# Patient Record
Sex: MALE | Employment: UNEMPLOYED | URBAN - METROPOLITAN AREA
[De-identification: names, ages, dates, MRNs, and addresses within clinical notes are randomized per-mention and may not be internally consistent; named-entity substitution may affect disease eponyms.]

---

## 2024-07-29 ENCOUNTER — APPOINTMENT (OUTPATIENT)
Dept: RADIOLOGY | Facility: MEDICAL CENTER | Age: 39
DRG: 219 | End: 2024-07-29
Attending: STUDENT IN AN ORGANIZED HEALTH CARE EDUCATION/TRAINING PROGRAM
Payer: COMMERCIAL

## 2024-07-29 ENCOUNTER — APPOINTMENT (OUTPATIENT)
Dept: CARDIOLOGY | Facility: MEDICAL CENTER | Age: 39
DRG: 219 | End: 2024-07-29
Payer: COMMERCIAL

## 2024-07-29 PROCEDURE — 71260 CT THORAX DX C+: CPT

## 2024-07-29 PROCEDURE — 93306 TTE W/DOPPLER COMPLETE: CPT

## 2024-07-29 PROCEDURE — 93306 TTE W/DOPPLER COMPLETE: CPT | Mod: 26 | Performed by: INTERNAL MEDICINE

## 2024-07-30 ENCOUNTER — APPOINTMENT (OUTPATIENT)
Dept: RADIOLOGY | Facility: MEDICAL CENTER | Age: 39
DRG: 219 | End: 2024-07-30
Payer: COMMERCIAL

## 2024-07-30 PROCEDURE — 72040 X-RAY EXAM NECK SPINE 2-3 VW: CPT

## 2024-07-31 ENCOUNTER — APPOINTMENT (OUTPATIENT)
Dept: RADIOLOGY | Facility: MEDICAL CENTER | Age: 39
DRG: 219 | End: 2024-07-31
Payer: COMMERCIAL

## 2024-07-31 ENCOUNTER — APPOINTMENT (OUTPATIENT)
Dept: CARDIOLOGY | Facility: MEDICAL CENTER | Age: 39
DRG: 219 | End: 2024-07-31
Payer: COMMERCIAL

## 2024-07-31 PROCEDURE — 76536 US EXAM OF HEAD AND NECK: CPT

## 2024-08-01 ENCOUNTER — ANESTHESIA (OUTPATIENT)
Dept: CARDIOLOGY | Facility: MEDICAL CENTER | Age: 39
DRG: 219 | End: 2024-08-01
Payer: COMMERCIAL

## 2024-08-01 ENCOUNTER — ANESTHESIA EVENT (OUTPATIENT)
Dept: CARDIOLOGY | Facility: MEDICAL CENTER | Age: 39
DRG: 219 | End: 2024-08-01
Payer: COMMERCIAL

## 2024-08-01 ENCOUNTER — APPOINTMENT (OUTPATIENT)
Dept: CARDIOLOGY | Facility: MEDICAL CENTER | Age: 39
DRG: 219 | End: 2024-08-01
Payer: COMMERCIAL

## 2024-08-01 PROBLEM — J18.9 PNEUMONIA OF BOTH LUNGS DUE TO INFECTIOUS ORGANISM: Status: RESOLVED | Noted: 2024-08-01 | Resolved: 2024-08-01

## 2024-08-01 PROBLEM — I38 ENDOCARDITIS: Status: ACTIVE | Noted: 2024-08-01

## 2024-08-01 PROBLEM — R50.9 ACUTE FEBRILE ILLNESS: Status: RESOLVED | Noted: 2024-07-29 | Resolved: 2024-08-01

## 2024-08-01 PROBLEM — J18.9 PNEUMONIA OF BOTH LUNGS DUE TO INFECTIOUS ORGANISM: Status: ACTIVE | Noted: 2024-08-01

## 2024-08-01 PROCEDURE — 93312 ECHO TRANSESOPHAGEAL: CPT | Mod: 26 | Performed by: INTERNAL MEDICINE

## 2024-08-01 PROCEDURE — 700101 HCHG RX REV CODE 250: Performed by: ANESTHESIOLOGY

## 2024-08-01 PROCEDURE — 93325 DOPPLER ECHO COLOR FLOW MAPG: CPT

## 2024-08-01 PROCEDURE — 700111 HCHG RX REV CODE 636 W/ 250 OVERRIDE (IP): Performed by: ANESTHESIOLOGY

## 2024-08-01 PROCEDURE — 700105 HCHG RX REV CODE 258: Performed by: ANESTHESIOLOGY

## 2024-08-01 RX ORDER — SODIUM CHLORIDE, SODIUM LACTATE, POTASSIUM CHLORIDE, CALCIUM CHLORIDE 600; 310; 30; 20 MG/100ML; MG/100ML; MG/100ML; MG/100ML
INJECTION, SOLUTION INTRAVENOUS
Status: DISCONTINUED | OUTPATIENT
Start: 2024-08-01 | End: 2024-08-01 | Stop reason: SURG

## 2024-08-01 RX ORDER — LIDOCAINE HYDROCHLORIDE 20 MG/ML
INJECTION, SOLUTION EPIDURAL; INFILTRATION; INTRACAUDAL; PERINEURAL PRN
Status: DISCONTINUED | OUTPATIENT
Start: 2024-08-01 | End: 2024-08-01 | Stop reason: SURG

## 2024-08-01 RX ADMIN — PROPOFOL 100 MG: 10 INJECTION, EMULSION INTRAVENOUS at 13:10

## 2024-08-01 RX ADMIN — PROPOFOL 100 MG: 10 INJECTION, EMULSION INTRAVENOUS at 13:01

## 2024-08-01 RX ADMIN — PROPOFOL 50 MG: 10 INJECTION, EMULSION INTRAVENOUS at 13:04

## 2024-08-01 RX ADMIN — FENTANYL CITRATE 100 MCG: 50 INJECTION, SOLUTION INTRAMUSCULAR; INTRAVENOUS at 12:56

## 2024-08-01 RX ADMIN — FENTANYL CITRATE 50 MCG: 50 INJECTION, SOLUTION INTRAMUSCULAR; INTRAVENOUS at 13:11

## 2024-08-01 RX ADMIN — LIDOCAINE HYDROCHLORIDE 50 MG: 20 INJECTION, SOLUTION EPIDURAL; INFILTRATION; INTRACAUDAL at 13:01

## 2024-08-01 RX ADMIN — SODIUM CHLORIDE, POTASSIUM CHLORIDE, SODIUM LACTATE AND CALCIUM CHLORIDE: 600; 310; 30; 20 INJECTION, SOLUTION INTRAVENOUS at 12:54

## 2024-08-01 RX ADMIN — FENTANYL CITRATE 50 MCG: 50 INJECTION, SOLUTION INTRAMUSCULAR; INTRAVENOUS at 13:16

## 2024-08-01 ASSESSMENT — PAIN SCALES - GENERAL: PAIN_LEVEL: 0

## 2024-08-01 NOTE — ANESTHESIA PREPROCEDURE EVALUATION
Date/Time: 08/01/24 1400    Scheduled providers: Jeffery Perkins M.D.; Wayne Bragg M.D.    Procedure: EC-MISHEL W/O CONT    Diagnosis:             Pulmonary cavitary lesion [J98.4]            Pulmonary cavitary lesion [J98.4]    Location: [facility] - Echocardiology [facility]            Relevant Problems   PULMONARY   (positive) Pneumonia of both lungs due to infectious organism       Physical Exam    Airway   Mallampati: II  TM distance: >3 FB  Neck ROM: full       Cardiovascular - normal exam  Rhythm: regular  Rate: normal  (-) murmur     Dental - normal exam           Pulmonary - normal exam  Breath sounds clear to auscultation     Abdominal    Neurological - normal exam                   Anesthesia Plan    ASA 2       Plan - general       Airway plan will be mask          Induction: intravenous      Pertinent diagnostic labs and testing reviewed    Informed Consent:    Anesthetic plan and risks discussed with patient.    Use of blood products discussed with: patient whom consented to blood products.

## 2024-08-01 NOTE — ANESTHESIA TIME REPORT
Anesthesia Start and Stop Event Times       Date Time Event    8/1/2024 1254 Ready for Procedure     1254 Anesthesia Start     1336 Anesthesia Stop          Responsible Staff  08/01/24      Name Role Begin End    Wayne Bragg M.D. Anesth 1254 1336          Overtime Reason:  no overtime (within assigned shift)    Comments:

## 2024-08-01 NOTE — ANESTHESIA POSTPROCEDURE EVALUATION
Patient: Two Ankur    Procedure Summary       Date: 08/01/24 Room / Location: [facility] Imaging - Echocardiology [facility]    Anesthesia Start: 1254 Anesthesia Stop: 1336    Procedure: EC-MISHEL W/O CONT Diagnosis:             Pulmonary cavitary lesion            Pulmonary cavitary lesion    Scheduled Providers: Jeffery Perkins M.D.; Wayne Bragg M.D. Responsible Provider: Wayne Bragg M.D.    Anesthesia Type: general ASA Status: 2            Final Anesthesia Type: general  Last vitals  BP   Blood Pressure: 118/57    Temp   36.8 °C (98.3 °F)    Pulse   (!) 121   Resp   (!) 22    SpO2   98 %      Anesthesia Post Evaluation    Patient location during evaluation: PACU  Patient participation: complete - patient participated  Level of consciousness: awake and alert  Pain score: 0    Airway patency: patent  Anesthetic complications: no  Cardiovascular status: hemodynamically stable  Respiratory status: acceptable  Hydration status: euvolemic    PONV: controlled          There were no known notable events for this encounter.     Nurse Pain Score: 0 (NPRS)

## 2024-08-02 PROBLEM — I33.0 ACUTE BACTERIAL ENDOCARDITIS: Status: ACTIVE | Noted: 2024-08-02

## 2024-08-06 ENCOUNTER — APPOINTMENT (OUTPATIENT)
Dept: RADIOLOGY | Facility: MEDICAL CENTER | Age: 39
DRG: 219 | End: 2024-08-06
Attending: NURSE PRACTITIONER
Payer: COMMERCIAL

## 2024-08-06 PROCEDURE — 71046 X-RAY EXAM CHEST 2 VIEWS: CPT

## 2024-08-07 ENCOUNTER — APPOINTMENT (OUTPATIENT)
Dept: CARDIOLOGY | Facility: MEDICAL CENTER | Age: 39
DRG: 219 | End: 2024-08-07
Attending: ANESTHESIOLOGY
Payer: COMMERCIAL

## 2024-08-07 ENCOUNTER — ANESTHESIA EVENT (OUTPATIENT)
Dept: SURGERY | Facility: MEDICAL CENTER | Age: 39
DRG: 219 | End: 2024-08-07
Payer: COMMERCIAL

## 2024-08-07 ENCOUNTER — ANESTHESIA (OUTPATIENT)
Dept: SURGERY | Facility: MEDICAL CENTER | Age: 39
DRG: 219 | End: 2024-08-07
Payer: COMMERCIAL

## 2024-08-07 ENCOUNTER — APPOINTMENT (OUTPATIENT)
Dept: RADIOLOGY | Facility: MEDICAL CENTER | Age: 39
DRG: 219 | End: 2024-08-07
Attending: THORACIC SURGERY (CARDIOTHORACIC VASCULAR SURGERY)
Payer: COMMERCIAL

## 2024-08-07 PROBLEM — Z86.19 HISTORY OF HEPATITIS C: Status: ACTIVE | Noted: 2024-08-07

## 2024-08-07 PROBLEM — Z99.11 ENCOUNTER FOR WEANING FROM VENTILATOR (HCC): Status: ACTIVE | Noted: 2024-08-07

## 2024-08-07 PROBLEM — R79.89 ELEVATED LFTS: Status: ACTIVE | Noted: 2024-07-29

## 2024-08-07 PROBLEM — Z95.4: Status: ACTIVE | Noted: 2024-08-07

## 2024-08-07 PROCEDURE — 700105 HCHG RX REV CODE 258: Performed by: NURSE PRACTITIONER

## 2024-08-07 PROCEDURE — 700101 HCHG RX REV CODE 250: Performed by: NURSE PRACTITIONER

## 2024-08-07 PROCEDURE — 93319 3D ECHO IMG CGEN CAR ANOMAL: CPT

## 2024-08-07 PROCEDURE — 700102 HCHG RX REV CODE 250 W/ 637 OVERRIDE(OP): Performed by: NURSE PRACTITIONER

## 2024-08-07 PROCEDURE — 700105 HCHG RX REV CODE 258: Performed by: ANESTHESIOLOGY

## 2024-08-07 PROCEDURE — 71045 X-RAY EXAM CHEST 1 VIEW: CPT

## 2024-08-07 PROCEDURE — 700111 HCHG RX REV CODE 636 W/ 250 OVERRIDE (IP): Performed by: NURSE PRACTITIONER

## 2024-08-07 PROCEDURE — 700111 HCHG RX REV CODE 636 W/ 250 OVERRIDE (IP): Performed by: THORACIC SURGERY (CARDIOTHORACIC VASCULAR SURGERY)

## 2024-08-07 PROCEDURE — 700111 HCHG RX REV CODE 636 W/ 250 OVERRIDE (IP): Performed by: ANESTHESIOLOGY

## 2024-08-07 PROCEDURE — 700101 HCHG RX REV CODE 250: Performed by: ANESTHESIOLOGY

## 2024-08-07 RX ORDER — SODIUM CHLORIDE, SODIUM GLUCONATE, SODIUM ACETATE, POTASSIUM CHLORIDE AND MAGNESIUM CHLORIDE 526; 502; 368; 37; 30 MG/100ML; MG/100ML; MG/100ML; MG/100ML; MG/100ML
INJECTION, SOLUTION INTRAVENOUS
Status: DISCONTINUED | OUTPATIENT
Start: 2024-08-07 | End: 2024-08-07 | Stop reason: SURG

## 2024-08-07 RX ORDER — HEPARIN SODIUM,PORCINE 1000/ML
VIAL (ML) INJECTION PRN
Status: DISCONTINUED | OUTPATIENT
Start: 2024-08-07 | End: 2024-08-07 | Stop reason: SURG

## 2024-08-07 RX ORDER — PROTAMINE SULFATE 10 MG/ML
INJECTION, SOLUTION INTRAVENOUS PRN
Status: DISCONTINUED | OUTPATIENT
Start: 2024-08-07 | End: 2024-08-07 | Stop reason: SURG

## 2024-08-07 RX ORDER — MIDAZOLAM HYDROCHLORIDE 1 MG/ML
INJECTION INTRAMUSCULAR; INTRAVENOUS PRN
Status: DISCONTINUED | OUTPATIENT
Start: 2024-08-07 | End: 2024-08-07 | Stop reason: SURG

## 2024-08-07 RX ORDER — SODIUM CHLORIDE 9 MG/ML
INJECTION, SOLUTION INTRAVENOUS
Status: DISCONTINUED | OUTPATIENT
Start: 2024-08-07 | End: 2024-08-07 | Stop reason: SURG

## 2024-08-07 RX ORDER — SODIUM CHLORIDE, SODIUM LACTATE, POTASSIUM CHLORIDE, CALCIUM CHLORIDE 600; 310; 30; 20 MG/100ML; MG/100ML; MG/100ML; MG/100ML
INJECTION, SOLUTION INTRAVENOUS
Status: DISCONTINUED | OUTPATIENT
Start: 2024-08-07 | End: 2024-08-07 | Stop reason: SURG

## 2024-08-07 RX ADMIN — AMINOCAPROIC ACID 2 G/HR: 250 INJECTION, SOLUTION INTRAVENOUS at 10:57

## 2024-08-07 RX ADMIN — HEPARIN SODIUM 32000 UNITS: 1000 INJECTION, SOLUTION INTRAVENOUS; SUBCUTANEOUS at 09:14

## 2024-08-07 RX ADMIN — EPINEPHRINE 0.02 MCG/KG/MIN: 1 INJECTION INTRAMUSCULAR; INTRAVENOUS; SUBCUTANEOUS at 11:31

## 2024-08-07 RX ADMIN — DEXMEDETOMIDINE 0.4 MCG/KG/HR: 200 INJECTION, SOLUTION INTRAVENOUS at 08:56

## 2024-08-07 RX ADMIN — NOREPINEPHRINE BITARTRATE 0.04 MCG/KG/MIN: 1 INJECTION, SOLUTION, CONCENTRATE INTRAVENOUS at 10:37

## 2024-08-07 RX ADMIN — METHADONE HYDROCHLORIDE 10 MG: 10 INJECTION, SOLUTION INTRAMUSCULAR; INTRAVENOUS; SUBCUTANEOUS at 08:07

## 2024-08-07 RX ADMIN — SODIUM CHLORIDE, SODIUM GLUCONATE, SODIUM ACETATE, POTASSIUM CHLORIDE AND MAGNESIUM CHLORIDE: 526; 502; 368; 37; 30 INJECTION, SOLUTION INTRAVENOUS at 08:03

## 2024-08-07 RX ADMIN — ROCURONIUM BROMIDE 50 MG: 10 INJECTION, SOLUTION INTRAVENOUS at 08:07

## 2024-08-07 RX ADMIN — ROCURONIUM BROMIDE 30 MG: 10 INJECTION, SOLUTION INTRAVENOUS at 09:19

## 2024-08-07 RX ADMIN — PROTAMINE SULFATE 250 MG: 10 INJECTION, SOLUTION INTRAVENOUS at 11:31

## 2024-08-07 RX ADMIN — ROCURONIUM BROMIDE 50 MG: 10 INJECTION, SOLUTION INTRAVENOUS at 11:49

## 2024-08-07 RX ADMIN — VANCOMYCIN HYDROCHLORIDE 1500 MG: 1 INJECTION, POWDER, LYOPHILIZED, FOR SOLUTION INTRAVENOUS at 08:26

## 2024-08-07 RX ADMIN — ROCURONIUM BROMIDE 30 MG: 10 INJECTION, SOLUTION INTRAVENOUS at 08:35

## 2024-08-07 RX ADMIN — ROCURONIUM BROMIDE 20 MG: 10 INJECTION, SOLUTION INTRAVENOUS at 09:07

## 2024-08-07 RX ADMIN — MIDAZOLAM HYDROCHLORIDE 2 MG: 2 INJECTION, SOLUTION INTRAMUSCULAR; INTRAVENOUS at 08:07

## 2024-08-07 RX ADMIN — PROPOFOL 150 MG: 10 INJECTION, EMULSION INTRAVENOUS at 08:07

## 2024-08-07 RX ADMIN — SODIUM CHLORIDE 2 UNITS/HR: 9 INJECTION, SOLUTION INTRAVENOUS at 12:11

## 2024-08-07 RX ADMIN — VASOPRESSIN 0.04 UNITS/HR: 20 INJECTION INTRAVENOUS at 11:15

## 2024-08-07 RX ADMIN — AMINOCAPROIC ACID 8000 MG: 250 INJECTION, SOLUTION INTRAVENOUS at 08:56

## 2024-08-07 RX ADMIN — SODIUM CHLORIDE, POTASSIUM CHLORIDE, SODIUM LACTATE AND CALCIUM CHLORIDE: 600; 310; 30; 20 INJECTION, SOLUTION INTRAVENOUS at 08:03

## 2024-08-07 RX ADMIN — ROCURONIUM BROMIDE 50 MG: 10 INJECTION, SOLUTION INTRAVENOUS at 10:43

## 2024-08-07 RX ADMIN — SODIUM CHLORIDE: 9 INJECTION, SOLUTION INTRAVENOUS at 08:03

## 2024-08-07 RX ADMIN — METHADONE HYDROCHLORIDE 10 MG: 10 INJECTION, SOLUTION INTRAMUSCULAR; INTRAVENOUS; SUBCUTANEOUS at 09:09

## 2024-08-07 NOTE — ANESTHESIA PROCEDURE NOTES
Central Venous Line    Performed by: Dallas Gray M.D.  Authorized by: Dallas Gray M.D.    Start Time:  8/7/2024 8:22 AM  End Time:  8/7/2024 8:30 AM  Patient Location:  OR  Indication: central venous access and hemodynamic monitoring        provider hand hygiene performed prior to central venous catheter insertion, all 5 sterile barriers used (gloves, gown, cap, mask, large sterile drape) during central venous catheter insertion and skin prep agent completely dried prior to procedure    Patient Position:  Trendelenburg  Laterality:  Right  Site:  Internal jugular  Prep:  Chlorhexidine  Catheter Size:  8.5 Fr  Catheter Length (cm):  11.5  Catheter Type:  Introducer  Number of Lumens:  Single lumen  target vein identified, needle advanced into vein and blood aspirated and guidewire advanced into vein    Seldinger Technique?: Yes    Ultrasound-Guided: ultrasound-guided  Image captured, interpreted and electronically stored.  Sterile Gel and Probe Cover Used for Ultrasound?: Yes    Intravenous Verification: verified by ultrasound, venous blood return and chest x-ray pending    all ports aspirated, all ports flushed easily, guidewire was removed intact, biopatch was applied, line was sutured in place and dressing was applied    Events: patient tolerated procedure well with no complications    PA Catheter Placed?: Yes    PA Catheter Type:  Non-oximetric  PA Catheter Size:  7  Laterality:  Right  Site:  Through introducer  Placement Confirmation: MISHEL and x-ray    Events: patient tolerated procedure well with no complications     Double stick technique.  Two central lines placed in RIJ vein using ultrasound guidance.  Lines placed at separate and distinct sites in R IJ.  One line is cordis and swan, and the other line is a triple lumen.

## 2024-08-07 NOTE — ANESTHESIA PROCEDURE NOTES
MISHEL    Date/Time: 8/7/2024 8:30 AM    Performed by: Dallas Gray M.D.  Authorized by: Dallas Gray M.D.    Start Time:8/7/2024 8:30 AM  Preanesthetic Checklist: patient identified, IV checked, site marked, risks and benefits discussed, surgical consent, monitors and equipment checked, pre-op evaluation and timeout performed    Indication for MISHEL: diagnostic   Patient Location: OR  Intubated: Yes  Bite Block: Yes  Heart Visualized: Yes  Insertion: atraumatic    **See FULL MISHEL report in patient's chart via CV [facility]**

## 2024-08-07 NOTE — ANESTHESIA TIME REPORT
Anesthesia Start and Stop Event Times       Date Time Event    8/7/2024 0803 Anesthesia Start     0904 Ready for Procedure     1254 Anesthesia Stop          Responsible Staff  08/07/24      Name Role Begin End    Dallas Gray M.D. Anesth 0803 1254          Overtime Reason:  no overtime (within assigned shift)    Comments:

## 2024-08-07 NOTE — ANESTHESIA PREPROCEDURE EVALUATION
" Case: [id number] Anesthesia Start Date/Time: 08/07/24 0803    Procedures:       REPAIR, TRICUSPID VALVE - PULMONIC VALVE REPLACEMENT      REPLACEMENT, AORTIC VALVE      ECHOCARDIOGRAM, TRANSESOPHAGEAL, INTRAOPERATIVE    Location: [facility] 02 / SURGERY [facility]    Surgeons: Irvin Mayes D.O.            Relevant Problems   Other   (positive) Acute bacterial endocarditis   (positive) Elevated LFTs   (positive) History of hepatitis C   (positive) IV drug abuse (HCC)     /57   Pulse 97   Temp 36.6 °C (97.9 °F) (Temporal)   Resp 20   Ht 1.778 m (5' 10\")   Wt 79.4 kg (175 lb 0.7 oz)   SpO2 92%   BMI 25.12 kg/m²     Physical Exam    Airway   Mallampati: II  TM distance: >3 FB  Neck ROM: full       Cardiovascular - normal exam  Rhythm: regular  Rate: normal  (-) murmur     Dental - normal exam           Pulmonary - normal exam  Breath sounds clear to auscultation     Abdominal    Neurological - normal exam                   Anesthesia Plan    ASA 4   ASA physical status 4 criteria: sepsis    Plan - general       Airway plan will be ETT          Induction: intravenous    Postoperative Plan: Postoperative administration of opioids is intended.    Pertinent diagnostic labs and testing reviewed    Informed Consent:    Anesthetic plan and risks discussed with patient.    Use of blood products discussed with: patient whom consented to blood products.           "

## 2024-08-07 NOTE — ANESTHESIA PROCEDURE NOTES
Arterial Line    Performed by: Dallas Gray M.D.  Authorized by: Dallas Gray M.D.    Start Time:  8/7/2024 8:15 AM  End Time:  8/7/2024 8:17 AM  Localization: surface landmarks    Patient Location:  OR  Indication: continuous blood pressure monitoring        Catheter Size:  20 G  Seldinger Technique?: Yes    Laterality:  Right  Site:  Radial artery  Line Secured:  Antimicrobial disc, tape and transparent dressing  Events: patient tolerated procedure well with no complications

## 2024-08-07 NOTE — ANESTHESIA PROCEDURE NOTES
Airway    Date/Time: 8/7/2024 8:08 AM    Performed by: Dallas Gray M.D.  Authorized by: Dallas Gray M.D.    Location:  OR  Urgency:  Elective  Difficult Airway: No    Indications for Airway Management:  Anesthesia      Spontaneous Ventilation: absent    Sedation Level:  Deep  Preoxygenated: Yes    Patient Position:  Sniffing  Mask Difficulty Assessment:  0 - not attempted  Final Airway Type:  Endotracheal airway  Final Endotracheal Airway:  ETT  Cuffed: Yes    Technique Used for Successful ETT Placement:  Direct laryngoscopy    Insertion Site:  Oral  Blade Type:  Erick  Laryngoscope Blade/Videolaryngoscope Blade Size:  3  ETT Size (mm):  8.0  Measured from:  Teeth  ETT to Teeth (cm):  23  Placement Verified by: auscultation and capnometry    Cormack-Lehane Classification:  Grade I - full view of glottis  Number of Attempts at Approach:  1

## 2024-08-07 NOTE — ANESTHESIA PROCEDURE NOTES
Central Venous Line    Performed by: Dallas Gray M.D.  Authorized by: Dallas Gray M.D.    Start Time:  8/7/2024 8:22 AM  End Time:  8/7/2024 8:30 AM  Patient Location:  OR  Indication: central venous access and hemodynamic monitoring        provider hand hygiene performed prior to central venous catheter insertion, all 5 sterile barriers used (gloves, gown, cap, mask, large sterile drape) during central venous catheter insertion and skin prep agent completely dried prior to procedure    Patient Position:  Trendelenburg  Laterality:  Right  Site:  Internal jugular  Prep:  Chlorhexidine  Catheter Size:  7 Fr  Catheter Length (cm):  20  Number of Lumens:  Triple lumen  target vein identified, needle advanced into vein and blood aspirated and guidewire advanced into vein    Seldinger Technique?: Yes    Ultrasound-Guided: ultrasound-guided  Image captured, interpreted and electronically stored.  Sterile Gel and Probe Cover Used for Ultrasound?: Yes    Intravenous Verification: verified by ultrasound, venous blood return and chest x-ray pending    all ports aspirated, all ports flushed easily, guidewire was removed intact, biopatch was applied, line was sutured in place and dressing was applied    Events: patient tolerated procedure well with no complications    PA Catheter Placed?: No     Double stick technique.  Two central lines placed in RIJ vein using ultrasound guidance.  Lines placed at separate and distinct sites in R IJ.  One line is cordis and swan, and the other line is a triple lumen.

## 2024-08-08 ENCOUNTER — APPOINTMENT (OUTPATIENT)
Dept: RADIOLOGY | Facility: MEDICAL CENTER | Age: 39
DRG: 219 | End: 2024-08-08
Attending: NURSE PRACTITIONER
Payer: COMMERCIAL

## 2024-08-08 PROBLEM — D62 ACUTE BLOOD LOSS ANEMIA: Status: ACTIVE | Noted: 2024-07-29

## 2024-08-08 PROCEDURE — 71045 X-RAY EXAM CHEST 1 VIEW: CPT

## 2024-08-08 NOTE — ANESTHESIA POSTPROCEDURE EVALUATION
Patient: Two Ankur    Procedure Summary       Date: 08/07/24 Room / Location: [facility] 02 / SURGERY [facility]    Anesthesia Start: 0803 Anesthesia Stop: 1254    Procedures:       PULMONIC VALVE REPLACEMENTWITH PULMONARY ARTERY REPAIR (Chest)      ECHOCARDIOGRAM, TRANSESOPHAGEAL, INTRAOPERATIVE (Esophagus) Diagnosis: (Bacterial endocarditis of pulmonary and tricuspid valves)    Surgeons: Irvin Mayes D.O. Responsible Provider: Dallas Gray M.D.    Anesthesia Type: general ASA Status: 4            Final Anesthesia Type: general  Last vitals  BP   Blood Pressure: 99/45, Arterial BP: (!) 98/42    Temp   36.6 °C (97.9 °F)    Pulse   75   Resp   20    SpO2   100 %      Anesthesia Post Evaluation    Patient location during evaluation: PACU  Patient participation: complete - patient participated  Level of consciousness: awake and alert    Airway patency: patent  Anesthetic complications: no  Cardiovascular status: hemodynamically stable  Respiratory status: face mask    PONV: none          No notable events documented.     Nurse Pain Score: 9 (NPRS)

## 2024-08-10 PROBLEM — R78.81 BACTEREMIA: Status: ACTIVE | Noted: 2024-08-10

## 2024-08-12 ENCOUNTER — APPOINTMENT (OUTPATIENT)
Dept: RADIOLOGY | Facility: MEDICAL CENTER | Age: 39
DRG: 219 | End: 2024-08-12
Attending: INTERNAL MEDICINE
Payer: COMMERCIAL

## 2024-08-15 ENCOUNTER — HOSPITAL ENCOUNTER (EMERGENCY)
Facility: MEDICAL CENTER | Age: 39
End: 2024-08-15
Attending: EMERGENCY MEDICINE
Payer: COMMERCIAL

## 2024-08-15 VITALS
RESPIRATION RATE: 14 BRPM | SYSTOLIC BLOOD PRESSURE: 104 MMHG | HEIGHT: 70 IN | WEIGHT: 175 LBS | OXYGEN SATURATION: 97 % | TEMPERATURE: 97.6 F | HEART RATE: 82 BPM | BODY MASS INDEX: 25.05 KG/M2 | DIASTOLIC BLOOD PRESSURE: 70 MMHG

## 2024-08-15 DIAGNOSIS — T82.898A OCCLUSION OF PERIPHERALLY INSERTED CENTRAL CATHETER (PICC) LINE, INITIAL ENCOUNTER (HCC): ICD-10-CM

## 2024-08-15 LAB
ANION GAP SERPL CALC-SCNC: 11 MMOL/L (ref 7–16)
BASOPHILS # BLD AUTO: 0.4 % (ref 0–1.8)
BASOPHILS # BLD: 0.03 K/UL (ref 0–0.12)
BUN SERPL-MCNC: 14 MG/DL (ref 8–22)
CALCIUM SERPL-MCNC: 8.3 MG/DL (ref 8.5–10.5)
CHLORIDE SERPL-SCNC: 100 MMOL/L (ref 96–112)
CO2 SERPL-SCNC: 25 MMOL/L (ref 20–33)
CREAT SERPL-MCNC: 0.49 MG/DL (ref 0.5–1.4)
EOSINOPHIL # BLD AUTO: 0.21 K/UL (ref 0–0.51)
EOSINOPHIL NFR BLD: 2.5 % (ref 0–6.9)
ERYTHROCYTE [DISTWIDTH] IN BLOOD BY AUTOMATED COUNT: 51.6 FL (ref 35.9–50)
GFR SERPLBLD CREATININE-BSD FMLA CKD-EPI: 136 ML/MIN/1.73 M 2
GLUCOSE SERPL-MCNC: 110 MG/DL (ref 65–99)
HCT VFR BLD AUTO: 30.6 % (ref 42–52)
HGB BLD-MCNC: 9.9 G/DL (ref 14–18)
IMM GRANULOCYTES # BLD AUTO: 0.04 K/UL (ref 0–0.11)
IMM GRANULOCYTES NFR BLD AUTO: 0.5 % (ref 0–0.9)
LYMPHOCYTES # BLD AUTO: 1.83 K/UL (ref 1–4.8)
LYMPHOCYTES NFR BLD: 22.1 % (ref 22–41)
MCH RBC QN AUTO: 28.1 PG (ref 27–33)
MCHC RBC AUTO-ENTMCNC: 32.4 G/DL (ref 32.3–36.5)
MCV RBC AUTO: 86.9 FL (ref 81.4–97.8)
MONOCYTES # BLD AUTO: 0.65 K/UL (ref 0–0.85)
MONOCYTES NFR BLD AUTO: 7.9 % (ref 0–13.4)
NEUTROPHILS # BLD AUTO: 5.51 K/UL (ref 1.82–7.42)
NEUTROPHILS NFR BLD: 66.6 % (ref 44–72)
NRBC # BLD AUTO: 0 K/UL
NRBC BLD-RTO: 0 /100 WBC (ref 0–0.2)
PLATELET # BLD AUTO: 307 K/UL (ref 164–446)
PMV BLD AUTO: 9.7 FL (ref 9–12.9)
POTASSIUM SERPL-SCNC: 4.2 MMOL/L (ref 3.6–5.5)
RBC # BLD AUTO: 3.52 M/UL (ref 4.7–6.1)
SODIUM SERPL-SCNC: 136 MMOL/L (ref 135–145)
WBC # BLD AUTO: 8.3 K/UL (ref 4.8–10.8)

## 2024-08-15 PROCEDURE — 700111 HCHG RX REV CODE 636 W/ 250 OVERRIDE (IP): Performed by: EMERGENCY MEDICINE

## 2024-08-15 PROCEDURE — 80048 BASIC METABOLIC PNL TOTAL CA: CPT

## 2024-08-15 PROCEDURE — 85025 COMPLETE CBC W/AUTO DIFF WBC: CPT

## 2024-08-15 PROCEDURE — 36415 COLL VENOUS BLD VENIPUNCTURE: CPT

## 2024-08-15 PROCEDURE — 99283 EMERGENCY DEPT VISIT LOW MDM: CPT

## 2024-08-15 RX ADMIN — HEPARIN 500 UNITS: 100 SYRINGE at 21:16

## 2024-08-15 RX ADMIN — HEPARIN 500 UNITS: 100 SYRINGE at 20:07

## 2024-08-15 ASSESSMENT — FIBROSIS 4 INDEX: FIB4 SCORE: 0.81

## 2024-08-16 NOTE — ED NOTES
PICC line flushed with heparin. Flush was met with resistance but then resolved after multiple attempts to flush. Erp notified. The pt is alert and oriented. PD at bedside. Vitals stable

## 2024-08-16 NOTE — ED NOTES
Pt resting on gurney , pt in retirement restraints , correctional officers at bedside , ERP at bedside

## 2024-08-16 NOTE — ED NOTES
PICC flushed with haparin with no resistance. The pt is alert and oriented. Vitals stable. PD at bedside

## 2024-08-16 NOTE — ED NOTES
"Pt discharged in PD custody. The pt is alert and oriented, calm and cooperative, talks with clear coherent speech, ambulates with a steady gait, moves extremities to dress self. The pt denies pain. Vitals stable on the monitor. Pt received copy of discharge instructions and verbalized understanding. /70   Pulse 82   Temp 36.4 °C (97.6 °F) (Temporal)   Resp 14   Ht 1.778 m (5' 10\")   Wt 79.4 kg (175 lb)   SpO2 97%   BMI 25.11 kg/m²     "

## 2024-08-16 NOTE — ED PROVIDER NOTES
ED Provider Note    CHIEF COMPLAINT  Chief Complaint   Patient presents with    Sent by MD     [facility] from [facility] for PICC line replacement, pt states that he needs a new one d/t clots in the PICC. Pt states that he has the PICC for abx admin        EXTERNAL RECORDS REVIEWED  Inpatient note 7/29 the patient was admitted found to have bacterial endocarditis with septic emboli to the lung.  He underwent pulmonary valve replacement on 8/7 supposed be on 6 weeks of cefepime as well as Bactrim twice daily for 6 months    HPI/ROS  LIMITATION TO HISTORY   Select: : None  OUTSIDE HISTORIAN(S):  Officers with the patient were just told that his PICC line was occluded and told him to bring him here    Claudia Valero is a 38 y.o. male, with past medical history of injecting meth into his neck mid July 2024, and the needle broke and still has it in his neck.  He was admitted to [facility] on July 29, 2024, when it was found that he had bacterial endocarditis and septic emboli to the lung and splenic emboli too.  Patient had a pulmonic valve replacement on 08/07/2024.  He is on cefepime which needed to be continued for 6 weeks after surgery and he was told to stop on 09/18/24.  Patient presented to ER today sent from his MD because of PICC line occluded.  At the ED patient reporting that he feels well denied any shortness of breath, chest pain, lower extremity swelling, orthopnea, paroxysmal nocturnal dyspnea.  Patient denied any headache, blurry vision, abdominal pain, diarrhea or constipation.  He denied any past medical history of heart disease, high blood pressure, diabetes or any other disease.  Patient denied any family history of heart disease but he stated that his mother has diabetes mellitus.    PAST MEDICAL HISTORY       SURGICAL HISTORY   has a past surgical history that includes tricuspid valve repair (8/7/2024) and echocardiogram, transesophageal, intraoperative (8/7/2024).    FAMILY  "HISTORY  No family history on file.    SOCIAL HISTORY  Social History     Tobacco Use    Smoking status: Never    Smokeless tobacco: Never   Vaping Use    Vaping status: Never Used   Substance and Sexual Activity    Alcohol use: Never    Drug use: Yes     Types: Intravenous     Comment: 2 weeks ago    Sexual activity: Not on file       CURRENT MEDICATIONS  Home Medications    **Home medications have not yet been reviewed for this encounter**         ALLERGIES  No Known Allergies    PHYSICAL EXAM  VITAL SIGNS: /58   Pulse 81   Temp 36.3 °C (97.4 °F) (Temporal)   Resp 16   Ht 1.778 m (5' 10\")   Wt 79.4 kg (175 lb)   SpO2 98%   BMI 25.11 kg/m²    Pulse OX: Pulse Oxygen level is within normal limits  Constitutional: Alert in no apparent distress.  HENT: Normocephalic, Atraumatic, MMM  Eyes: PERound. Conjunctiva normal, non-icteric.   Heart: Regular rate and rhythm, intact distal pulses 3 out of 6 murmur  Lungs: Symmetrical movement, no resp distress clear to auscultation bilaterally  Abdomen: Non-tender, non-distended, normal bowel sounds  EXT/Back no edema PICC line in the right upper extremity clean dry and intact without erythema induration or tenderness  Skin: Warm, Dry, No erythema, No rash.   Neurologic: Alert and oriented, Grossly non-focal.       EKG/LABS  Labs Reviewed   CBC WITH DIFFERENTIAL - Abnormal; Notable for the following components:       Result Value    RBC 3.52 (*)     Hemoglobin 9.9 (*)     Hematocrit 30.6 (*)     RDW 51.6 (*)     All other components within normal limits   BASIC METABOLIC PANEL - Abnormal; Notable for the following components:    Glucose 110 (*)     Creatinine 0.49 (*)     Calcium 8.3 (*)     All other components within normal limits   ESTIMATED GFR           I have independently interpreted this EKG        COURSE & MEDICAL DECISION MAKING    ASSESSMENT, COURSE AND PLAN  Care Narrative: This is a 37 years old male with past medical history of drug use complicated with " infective endocarditis, thrombotic pulmonary embolus, along with splenic embolism.  Patient on cefepime for 6-week use.  Referred to ED by his MD for PICC line occluded.    Will flush the PICC line with heparin flush to see if we can unclog it if this does not work then the patient and need to be hospitalized for PICC line replacement IV antibiotics.    DISPOSITION AND DISCUSSIONS  Fortunately the PICC was able to be flushed here in the emerged part with heparin it is flushing well and drawing well.  He was reflushed again for heparin lock so that it does not occlude when he is discharged back to his facility.  He is otherwise well-appearing no elevated white blood cell count mild chronic anemia renal function is unremarkable a otherwise there is no indication for hospitalization at this time        I have discussed management of the patient with the following physicians and NOEL's:  none    Discussion of management with other QHP or appropriate source(s): None     Escalation of care considered, and ultimately not performed:diagnostic imaging    Barriers to care at this time, including but not limited to:  na .     Decision tools and prescription drugs considered including, but not limited to:  na .    The patient will return for new or worsening symptoms and is stable at the time of discharge.    The patient is referred to a primary physician for blood pressure management, diabetic screening, and for all other preventative health concerns.    DISPOSITION:  Patient will be discharged home in stable condition.    FOLLOW UP:  [facility], Emergency Dept  [address] [address] [phone]    If symptoms worsen      OUTPATIENT MEDICATIONS:  Discharge Medication List as of 8/15/2024  9:37 PM            FINAL DIAGNOSIS  1. Occlusion of peripherally inserted central catheter (PICC) line, initial encounter (HCC)         Electronically signed by: Mary Don M.D., 8/15/2024 6:21  PM

## 2024-08-16 NOTE — ED NOTES
Bedside report received from off going RN/tech: Sree, assumed care of patient.  POC discussed with patient. Call light within reach, all needs addressed at this time.       Fall risk interventions in place: Not Applicable (all applicable per [place] Fall risk assessment)   Continuous monitoring: Pulse Ox or Blood Pressure  IVF/IV medications: Not Applicable   Oxygen: Room Air  Bedside sitter: Not Applicable   Isolation: Not Applicable      d

## 2024-09-20 ENCOUNTER — TELEPHONE (OUTPATIENT)
Dept: CARDIOLOGY | Facility: MEDICAL CENTER | Age: 39
End: 2024-09-20
Payer: COMMERCIAL

## 2024-09-20 NOTE — TELEPHONE ENCOUNTER
Phone number called: [phone]     Call outcome: 2nd attempt. No answer. LVM. Need to get more information.

## 2024-09-20 NOTE — TELEPHONE ENCOUNTER
ADD(BD)          Caller: Louann with [facility]    Topic/issue: Their office was calling because the physicians at the facility wanted to know if he could switch from his Warfarin 9 mg prescription to Lovenox due to taking an oral antibiotic, Bactrim, and it impedes the warfarin and their office was asking for a call back.      Callback Number: [phone]      Thank you    -Luis Alberto KATZ

## 2024-09-20 NOTE — TELEPHONE ENCOUNTER
Phone number called: Louann with [facility] ([phone])    Call outcome: I tried to contact the number above but unable to reach the . Left VM and will try to call again.

## 2024-09-27 ENCOUNTER — HOSPITAL ENCOUNTER (EMERGENCY)
Facility: MEDICAL CENTER | Age: 39
End: 2024-09-27
Attending: STUDENT IN AN ORGANIZED HEALTH CARE EDUCATION/TRAINING PROGRAM
Payer: COMMERCIAL

## 2024-09-27 ENCOUNTER — APPOINTMENT (OUTPATIENT)
Dept: RADIOLOGY | Facility: MEDICAL CENTER | Age: 39
End: 2024-09-27
Attending: STUDENT IN AN ORGANIZED HEALTH CARE EDUCATION/TRAINING PROGRAM
Payer: COMMERCIAL

## 2024-09-27 ENCOUNTER — OFFICE VISIT (OUTPATIENT)
Dept: CARDIOLOGY | Facility: MEDICAL CENTER | Age: 39
End: 2024-09-27
Attending: INTERNAL MEDICINE
Payer: COMMERCIAL

## 2024-09-27 VITALS
HEIGHT: 70 IN | OXYGEN SATURATION: 96 % | HEART RATE: 70 BPM | SYSTOLIC BLOOD PRESSURE: 106 MMHG | BODY MASS INDEX: 23.34 KG/M2 | DIASTOLIC BLOOD PRESSURE: 53 MMHG | RESPIRATION RATE: 18 BRPM | WEIGHT: 163 LBS | TEMPERATURE: 97.4 F

## 2024-09-27 VITALS
WEIGHT: 163 LBS | BODY MASS INDEX: 23.34 KG/M2 | RESPIRATION RATE: 14 BRPM | OXYGEN SATURATION: 98 % | HEIGHT: 70 IN | HEART RATE: 78 BPM | SYSTOLIC BLOOD PRESSURE: 108 MMHG | DIASTOLIC BLOOD PRESSURE: 56 MMHG

## 2024-09-27 DIAGNOSIS — S50.01XA CONTUSION OF RIGHT ELBOW, INITIAL ENCOUNTER: ICD-10-CM

## 2024-09-27 DIAGNOSIS — Z86.79 HISTORY OF ENDOCARDITIS: ICD-10-CM

## 2024-09-27 DIAGNOSIS — S20.219A CONTUSION OF CHEST WALL, UNSPECIFIED LATERALITY, INITIAL ENCOUNTER: ICD-10-CM

## 2024-09-27 DIAGNOSIS — Z29.89 NEED FOR SBE (SUBACUTE BACTERIAL ENDOCARDITIS) PROPHYLAXIS: ICD-10-CM

## 2024-09-27 DIAGNOSIS — Z95.2 HX OF PULMONIC VALVE REPLACEMENT: ICD-10-CM

## 2024-09-27 PROCEDURE — 307740 HCHG GREEN TRAUMA TEAM SERVICES

## 2024-09-27 PROCEDURE — 700102 HCHG RX REV CODE 250 W/ 637 OVERRIDE(OP): Performed by: STUDENT IN AN ORGANIZED HEALTH CARE EDUCATION/TRAINING PROGRAM

## 2024-09-27 PROCEDURE — 71250 CT THORAX DX C-: CPT

## 2024-09-27 PROCEDURE — 99284 EMERGENCY DEPT VISIT MOD MDM: CPT

## 2024-09-27 PROCEDURE — A9270 NON-COVERED ITEM OR SERVICE: HCPCS | Performed by: STUDENT IN AN ORGANIZED HEALTH CARE EDUCATION/TRAINING PROGRAM

## 2024-09-27 PROCEDURE — 73080 X-RAY EXAM OF ELBOW: CPT | Mod: RT

## 2024-09-27 RX ORDER — ACETAMINOPHEN 325 MG/1
975 TABLET ORAL ONCE
Status: COMPLETED | OUTPATIENT
Start: 2024-09-27 | End: 2024-09-27

## 2024-09-27 RX ADMIN — ACETAMINOPHEN 975 MG: 325 TABLET ORAL at 18:09

## 2024-09-27 ASSESSMENT — FIBROSIS 4 INDEX: FIB4 SCORE: 0.75

## 2024-09-27 NOTE — PROGRESS NOTES
"    Cardiology Initial Consultation Note    Date of note:    9/27/2024    Primary Care Provider: Pcp Unknown  Referring Provider: No ref. provider found    Patient Name: Alcides Bragg     YOB: 1985  MRN:              [id number]    No chief complaint on file.      History of Present Illness: Mr. Alcides Bragg is a 38-year-old man with past medical history significant for polysubstance abuse including IV drug use and methamphetamine abuse underwent recent pulmonic valve replacement after he was admitted to the hospital and found to have endocarditis.    He was taken to the OR by Dr. Mayes and had pulmonic valve replacement with 29 mm bioprosthetic Inspiris valve.  Also had pulmonary artery patch angioplasty.  During the OR, MISHEL showed no evidence of tricuspid valve endocarditis.  In the value was examined during surgery which appeared to be free of disease.     He presents to the cardiology office 1 month after valve replacement to establish care.  He is currently in the custody of a correctional officers and currently an inmate at a correctional facility.    Today, he has no major cardiac complaints.  He is no longer on Lasix or metoprolol.  He denies having chest pain or dyspnea.  No lightheadedness/dizziness or episodes of syncope.  No lower extremity edema.  Remains on Coumadin as managed by the correctional facility.    Cardiovascular Risk Factors:  1. Smoking status: Denies  2. Type II Diabetes Mellitus: Prediabetes   Lab Results   Component Value Date/Time    HBA1C 6.4 (H) 08/06/2024 03:33 PM     3. Hypertension: Denies  4. Dyslipidemia: Denies No results found for: \"CHOLSTRLTOT\", \"LDL\", \"HDL\", \"TRIGLYCERIDE\"  5. Family history of early Coronary Artery Disease in a first degree relative (Male less than 55 years of age; Female less than 65 years of age): Denies      Review of Systems:  As per HPI.  Review of systems assessed and are negative except as stated above.      History reviewed. " No pertinent past medical history.      Past Surgical History:   Procedure Laterality Date    TRICUSPID VALVE REPAIR  8/7/2024    Procedure: PULMONIC VALVE REPLACEMENTWITH PULMONARY ARTERY REPAIR;  Surgeon: Irvin Mayes D.O.;  Location: SURGERY [facility];  Service: Cardiothoracic    ECHOCARDIOGRAM, TRANSESOPHAGEAL, INTRAOPERATIVE  8/7/2024    Procedure: ECHOCARDIOGRAM, TRANSESOPHAGEAL, INTRAOPERATIVE;  Surgeon: Irvin Mayes D.O.;  Location: SURGERY [facility];  Service: Cardiothoracic         Current Outpatient Medications   Medication Sig Dispense Refill    warfarin (COUMADIN) 10 MG Tab Take 1 Tablet by mouth every day at 6 PM.      sulfamethoxazole-trimethoprim (BACTRIM DS) 800-160 MG tablet Take 1 Tablet by mouth 2 times a day. Start after IV antibiotics (cefepime) therapy has completed. 90 Tablet 0     No current facility-administered medications for this visit.         No Known Allergies      History reviewed. No pertinent family history.      Social History     Socioeconomic History    Marital status: Unknown     Spouse name: Not on file    Number of children: Not on file    Years of education: Not on file    Highest education level: Not on file   Occupational History    Not on file   Tobacco Use    Smoking status: Never    Smokeless tobacco: Never   Vaping Use    Vaping status: Never Used   Substance and Sexual Activity    Alcohol use: Never    Drug use: Yes     Types: Intravenous     Comment: 2 weeks ago    Sexual activity: Not on file   Other Topics Concern    Not on file   Social History Narrative    Not on file     Social Determinants of Health     Financial Resource Strain: Not on file   Food Insecurity: No Food Insecurity (7/29/2024)    Hunger Vital Sign     Worried About Running Out of Food in the Last Year: Never true     Ran Out of Food in the Last Year: Never true   Transportation Needs: No Transportation Needs (7/29/2024)    PRAPARE - Transportation     Lack of Transportation  "(Medical): No     Lack of Transportation (Non-Medical): No   Physical Activity: Not on file   Stress: Not on file   Social Connections: Not on file   Intimate Partner Violence: Not At Risk (7/29/2024)    Humiliation, Afraid, Rape, and Kick questionnaire     Fear of Current or Ex-Partner: No     Emotionally Abused: No     Physically Abused: No     Sexually Abused: No   Housing Stability: Unknown (7/29/2024)    Housing Stability Vital Sign     Unable to Pay for Housing in the Last Year: No     Number of Places Lived in the Last Year: Not on file     Unstable Housing in the Last Year: No         Physical Exam:  Ambulatory Vitals  /56 (BP Location: Left arm, Patient Position: Sitting, BP Cuff Size: Adult)   Pulse 78   Resp 14   Ht 1.778 m (5' 10\")   Wt 73.9 kg (163 lb)   SpO2 98%    Oxygen Therapy:  Pulse Oximetry: 98 %  BP Readings from Last 4 Encounters:   09/27/24 108/56   08/15/24 104/70   08/13/24 92/54       Weight/BMI: Body mass index is 23.39 kg/m².  Wt Readings from Last 4 Encounters:   09/27/24 73.9 kg (163 lb)   08/15/24 79.4 kg (175 lb)   08/13/24 79.9 kg (176 lb 2.4 oz)         General: Not in acute distress, appears comfortable  HEENT: OP clear   Neck:  No carotid bruits, No JVD appreciated  CVS:  RRR, Normal S1, S2. No murmurs, rubs or gallops  Resp: Normal respiratory effort, lungs CTA bilaterally. No rales or rhonchi  Abdomen: Soft, non-distended, non-tender to palpation  Neurological: Alert and oriented x3, moves all extremities, no focal neurologic deficits  Psychiatric: Appropriate affect  Extremities:   Extremities warm, no edema, pulses intact      Lab Data Review:  No results found for: \"CHOLSTRLTOT\", \"LDL\", \"HDL\", \"TRIGLYCERIDE\"    Lab Results   Component Value Date/Time    SODIUM 136 08/15/2024 08:15 PM    POTASSIUM 4.2 08/15/2024 08:15 PM    CHLORIDE 100 08/15/2024 08:15 PM    CO2 25 08/15/2024 08:15 PM    GLUCOSE 110 (H) 08/15/2024 08:15 PM    BUN 14 08/15/2024 08:15 PM    " CREATININE 0.49 (L) 08/15/2024 08:15 PM     Lab Results   Component Value Date/Time    ALKPHOSPHAT 196 (H) 08/13/2024 02:43 AM    ASTSGOT 54 (H) 08/13/2024 02:43 AM    ALTSGPT 71 (H) 08/13/2024 02:43 AM    TBILIRUBIN 0.3 08/13/2024 02:43 AM      Lab Results   Component Value Date/Time    WBC 8.3 08/15/2024 08:15 PM    HEMOGLOBIN 9.9 (L) 08/15/2024 08:15 PM     Lab Results   Component Value Date/Time    HBA1C 6.4 (H) 08/06/2024 03:33 PM         Cardiac Imaging and Procedures Review:    EKG dated 8/8/2024:   My personal interpretation is sinus rhythm, diffuse ST segment elevation suggestive of pericarditis    MISHEL dated 8/7/2024:   Intraoperative MISHEL during pulmonic valve replacement. Pre-CPB images   show Normal biventricular function. Large > 1cm mobile mass on the   pulmonic valve consistent with vegetation.   Post CPB images show   preserved function.  A new 29  mm Latif bioprosthetic valve is seen   in the pulmonic position with normal leaflet motion, no paravalvular   leak, and no evidence of stenosis. Findings communicated at the time of   exam.       Assessment & Plan     1. History of endocarditis        2. Hx of pulmonic valve replacement        3. Need for SBE (subacute bacterial endocarditis) prophylaxis              Medical Decision Making:  Mr. Alcides Bragg is a 38-year-old man with past medical history significant for polysubstance abuse including IV drug use and methamphetamine abuse underwent recent pulmonic valve replacement after he was admitted to the hospital and found to have endocarditis.    1. History of endocarditis  2. Hx of pulmonic valve replacement  3. Need for SBE (subacute bacterial endocarditis) prophylaxis    Recent hospitalization with bacteremia and pulmonic valve endocarditis.  He underwent successful pulmonic valve replacement with 29 mm bioprosthetic Inspiris valve.  Intraoperative MISHEL showed normal prosthetic valve function and leaflet motion without evidence of KEL valvular  leak.    He has been maintained on Coumadin following his valve replacement.  INR goal of 2-3.  Given that this is a bioprosthetic valve, will only require continuation of Coumadin for approximately 3 to 6 months.  After which, he should be continued on aspirin 81 mg daily.    We discussed at length the importance of polysubstance abuse cessation is especially IV drug use given history of bacteremia and endocarditis.  Explained that he is at risk for recurrent infection if he continues to use IV drugs.  In addition, we discussed the importance of antibiotic prophylaxis prior to dental cleanings.  Will require amoxicillin 2000 mg 30 minutes to 1 hour prior to dental cleanings lifelong.  Will need repeat echocardiogram in 6 months to 1 year for assessment of prosthetic valve function.    It was a pleasure seeing Mr. Alcides Bragg in the office today. Return in about 1 year (around 9/27/2025). Patient is aware to call the cardiology clinic with any questions or concerns.      Micha Boucher MD, [facility]  Cardiologist, [facility], [facility]  [address] [address]  Phone: [phone]  Fax: [phone]    Please note that this dictation was created using voice recognition software. I have made every reasonable attempt to correct obvious errors, but it is possible there are errors of grammar and possibly content that I did not discover before finalizing the note.

## 2024-09-28 NOTE — DISCHARGE INSTRUCTIONS
Medically cleared for incarceration      You may take Tylenol 1000 mg every 4 hours    Do not take more than 4000 mg of Tylenol in a 24-hour period
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
## 2024-09-28 NOTE — ED NOTES
Patient and custodial guards provided discharge instructions. Patient verbalized understanding. Patient leaving ER in stable condition. Patient ambulatory with steady gait in cuffs with guards. Wristband removed.
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
## 2025-04-14 ENCOUNTER — TELEPHONE (OUTPATIENT)
Dept: CARDIOLOGY | Facility: MEDICAL CENTER | Age: 40
End: 2025-04-14
Payer: COMMERCIAL

## 2025-04-14 DIAGNOSIS — Z86.79 HISTORY OF ENDOCARDITIS: ICD-10-CM

## 2025-04-14 DIAGNOSIS — Z95.2 HX OF PULMONIC VALVE REPLACEMENT: ICD-10-CM

## 2025-04-17 NOTE — TELEPHONE ENCOUNTER
Noted MK response/recommendations:    Micha SALAZAR M.D.  You; Alina Cid hour ago (10:11 AM)     Routine MISHEL 6 months after pulmonic valve replacement generally not indicated unless there are other reasons/concerns. Either way, I recommend starting with TTE first. If abnormalities then can proceed with MISHEL.    MK   ==================================    Order placed.     To Alina KULKARNI